# Patient Record
Sex: FEMALE | Race: WHITE | URBAN - METROPOLITAN AREA
[De-identification: names, ages, dates, MRNs, and addresses within clinical notes are randomized per-mention and may not be internally consistent; named-entity substitution may affect disease eponyms.]

---

## 2020-06-25 ENCOUNTER — TELEPHONE ENCOUNTER (OUTPATIENT)
Dept: URBAN - METROPOLITAN AREA CLINIC 40 | Facility: CLINIC | Age: 45
End: 2020-06-25

## 2020-06-25 RX ORDER — AZATHIOPRINE 50 1/1
TAKE TWO TABLETS BY MOUTH ONE TIME DAILY FOR 30 DAYS TABLET ORAL
Qty: 60 | Refills: 11
Start: 2019-06-03

## 2020-07-14 ENCOUNTER — TELEPHONE ENCOUNTER (OUTPATIENT)
Dept: URBAN - METROPOLITAN AREA CLINIC 23 | Facility: CLINIC | Age: 45
End: 2020-07-14

## 2020-07-14 ENCOUNTER — OFFICE VISIT (OUTPATIENT)
Dept: URBAN - METROPOLITAN AREA CLINIC 97 | Facility: CLINIC | Age: 45
End: 2020-07-14
Payer: COMMERCIAL

## 2020-07-14 DIAGNOSIS — K50.80 CROHN'S COLITIS: ICD-10-CM

## 2020-07-14 PROCEDURE — 96415 CHEMO IV INFUSION ADDL HR: CPT | Performed by: INTERNAL MEDICINE

## 2020-07-14 PROCEDURE — 96413 CHEMO IV INFUSION 1 HR: CPT | Performed by: INTERNAL MEDICINE

## 2020-07-14 RX ORDER — INFLIXIMAB 100 MG/10ML
INFUSE 10 MG/KG OVER NO LESS THAN 2 HOUR(S) BY INTRAVENOUS ROUTE EVERY 8 WEEKS INJECTION, POWDER, LYOPHILIZED, FOR SOLUTION INTRAVENOUS
Refills: 11
Start: 2019-06-03

## 2020-07-14 RX ORDER — ESCITALOPRAM 5 MG/1
TABLET, FILM COATED ORAL
Qty: 0 | Refills: 0 | Status: ACTIVE | COMMUNITY
Start: 1900-01-01 | End: 1900-01-01

## 2020-07-14 RX ORDER — INFLIXIMAB 100 MG/10ML
INFUSE 10 MG/KG OVER NO LESS THAN 2 HOUR(S) BY INTRAVENOUS ROUTE EVERY 8 WEEKS INJECTION, POWDER, LYOPHILIZED, FOR SOLUTION INTRAVENOUS
Qty: 1 | Refills: 11 | Status: ACTIVE | COMMUNITY
Start: 2019-06-03 | End: 2021-05-23

## 2020-07-14 RX ORDER — AZATHIOPRINE 50 1/1
TAKE TWO TABLETS BY MOUTH ONE TIME DAILY FOR 30 DAYS TABLET ORAL
Qty: 60 | Refills: 11 | Status: ACTIVE | COMMUNITY
Start: 2019-06-03

## 2020-07-20 ENCOUNTER — OFFICE VISIT (OUTPATIENT)
Dept: URBAN - METROPOLITAN AREA CLINIC 40 | Facility: CLINIC | Age: 45
End: 2020-07-20
Payer: COMMERCIAL

## 2020-07-20 ENCOUNTER — TELEPHONE ENCOUNTER (OUTPATIENT)
Dept: URBAN - METROPOLITAN AREA CLINIC 40 | Facility: CLINIC | Age: 45
End: 2020-07-20

## 2020-07-20 DIAGNOSIS — Z90.49 HX OF RIGHT HEMICOLECTOMY: ICD-10-CM

## 2020-07-20 DIAGNOSIS — Z87.2: ICD-10-CM

## 2020-07-20 DIAGNOSIS — K50.90 CROHN'S DISEASE IN REMISSION: ICD-10-CM

## 2020-07-20 PROCEDURE — 99213 OFFICE O/P EST LOW 20 MIN: CPT | Performed by: INTERNAL MEDICINE

## 2020-07-20 PROCEDURE — G8938 BMI DOC ONL FUP NT DOC: HCPCS | Performed by: INTERNAL MEDICINE

## 2020-07-20 PROCEDURE — G8427 DOCREV CUR MEDS BY ELIG CLIN: HCPCS | Performed by: INTERNAL MEDICINE

## 2020-07-20 PROCEDURE — 1036F TOBACCO NON-USER: CPT | Performed by: INTERNAL MEDICINE

## 2020-07-20 RX ORDER — ESCITALOPRAM 5 MG/1
TABLET, FILM COATED ORAL
Qty: 0 | Refills: 0 | Status: ACTIVE | COMMUNITY
Start: 1900-01-01

## 2020-07-20 RX ORDER — AZATHIOPRINE 50 1/1
TAKE TWO TABLETS BY MOUTH ONE TIME DAILY FOR 30 DAYS TABLET ORAL
Qty: 60 | Refills: 11 | Status: ACTIVE | COMMUNITY
Start: 2019-06-03

## 2020-07-20 RX ORDER — INFLIXIMAB 100 MG/10ML
INFUSE 10 MG/KG OVER NO LESS THAN 2 HOUR(S) BY INTRAVENOUS ROUTE EVERY 8 WEEKS INJECTION, POWDER, LYOPHILIZED, FOR SOLUTION INTRAVENOUS
Refills: 11 | Status: ACTIVE | COMMUNITY
Start: 2019-06-03

## 2020-07-20 RX ORDER — AZATHIOPRINE 50 1/1
TAKE TWO TABLETS BY MOUTH ONE TIME DAILY FOR 30 DAYS TABLET ORAL
Qty: 60 | Refills: 11
Start: 2019-06-03

## 2020-07-20 NOTE — HPI-TODAY'S VISIT:
Patient presents for follow-up of inflammatory bowel disease and she is doing very well at this time and in clinical remission on Remicade 10 mg/kg every 8 weeks.  Last seen about a year ago and was doing well at that time as well.  Prior right hemicolectomy at age 21.  Last colonoscopy 2018 with no evidence of active disease.  Patient was quite symptomatic on first visit with noted complication of erythema nodosum.  Both her IBD and erythema nodosum resolved after initiating Remicade and eventually she stopped mesalamine, but continues Azathioprine 100mg daily.

## 2020-07-21 LAB
A/G RATIO: 1.5
ALBUMIN: 4.5
ALKALINE PHOSPHATASE: 69
ALT (SGPT): 14
AST (SGOT): 18
BASO (ABSOLUTE): 0.1
BASOS: 1
BILIRUBIN, TOTAL: 1.3
BUN/CREATININE RATIO: 15
BUN: 13
CALCIUM: 9.1
CARBON DIOXIDE, TOTAL: 21
CHLORIDE: 100
CREATININE: 0.89
EGFR IF AFRICN AM: 92
EGFR IF NONAFRICN AM: 80
EOS (ABSOLUTE): 0.1
EOS: 2
GLOBULIN, TOTAL: 3.1
GLUCOSE: 85
HEMATOCRIT: 41.4
HEMATOLOGY COMMENTS:: (no result)
HEMOGLOBIN: 14
IMMATURE CELLS: (no result)
IMMATURE GRANS (ABS): 0
IMMATURE GRANULOCYTES: 0
LYMPHS (ABSOLUTE): 1.5
LYMPHS: 21
MCH: 30.2
MCHC: 33.8
MCV: 89
MONOCYTES(ABSOLUTE): 0.7
MONOCYTES: 10
NEUTROPHILS (ABSOLUTE): 5
NEUTROPHILS: 66
NRBC: (no result)
PLATELETS: 382
POTASSIUM: 4.4
PROTEIN, TOTAL: 7.6
RBC: 4.63
RDW: 12.6
SODIUM: 138
WBC: 7.5

## 2020-09-02 ENCOUNTER — OFFICE VISIT (OUTPATIENT)
Dept: URBAN - METROPOLITAN AREA CLINIC 39 | Facility: CLINIC | Age: 45
End: 2020-09-02
Payer: COMMERCIAL

## 2020-09-02 VITALS
SYSTOLIC BLOOD PRESSURE: 110 MMHG | BODY MASS INDEX: 41.48 KG/M2 | RESPIRATION RATE: 18 BRPM | HEIGHT: 64 IN | TEMPERATURE: 97.5 F | DIASTOLIC BLOOD PRESSURE: 71 MMHG | WEIGHT: 243 LBS

## 2020-09-02 DIAGNOSIS — K50.80 CROHN'S COLITIS: ICD-10-CM

## 2020-09-02 PROCEDURE — 96415 CHEMO IV INFUSION ADDL HR: CPT | Performed by: INTERNAL MEDICINE

## 2020-09-02 PROCEDURE — 96413 CHEMO IV INFUSION 1 HR: CPT | Performed by: INTERNAL MEDICINE

## 2020-09-02 RX ORDER — AZATHIOPRINE 50 1/1
TAKE TWO TABLETS BY MOUTH ONE TIME DAILY FOR 30 DAYS TABLET ORAL
Qty: 60 | Refills: 11 | Status: ACTIVE | COMMUNITY
Start: 2019-06-03

## 2020-09-02 RX ORDER — ESCITALOPRAM 5 MG/1
TABLET, FILM COATED ORAL
Qty: 0 | Refills: 0 | Status: ACTIVE | COMMUNITY
Start: 1900-01-01

## 2020-09-02 RX ORDER — INFLIXIMAB 100 MG/10ML
INFUSE 10 MG/KG OVER NO LESS THAN 2 HOUR(S) BY INTRAVENOUS ROUTE EVERY 8 WEEKS INJECTION, POWDER, LYOPHILIZED, FOR SOLUTION INTRAVENOUS
Refills: 11 | Status: ACTIVE | COMMUNITY
Start: 2019-06-03

## 2020-10-28 ENCOUNTER — OFFICE VISIT (OUTPATIENT)
Dept: URBAN - METROPOLITAN AREA CLINIC 39 | Facility: CLINIC | Age: 45
End: 2020-10-28
Payer: COMMERCIAL

## 2020-10-28 VITALS
HEART RATE: 97 BPM | TEMPERATURE: 98.6 F | SYSTOLIC BLOOD PRESSURE: 135 MMHG | DIASTOLIC BLOOD PRESSURE: 93 MMHG | HEIGHT: 64 IN | BODY MASS INDEX: 40.12 KG/M2 | RESPIRATION RATE: 18 BRPM | WEIGHT: 235 LBS

## 2020-10-28 DIAGNOSIS — K50.80 CROHN'S COLITIS: ICD-10-CM

## 2020-10-28 PROCEDURE — 96415 CHEMO IV INFUSION ADDL HR: CPT | Performed by: INTERNAL MEDICINE

## 2020-10-28 PROCEDURE — 96375 TX/PRO/DX INJ NEW DRUG ADDON: CPT | Performed by: INTERNAL MEDICINE

## 2020-10-28 PROCEDURE — 96413 CHEMO IV INFUSION 1 HR: CPT | Performed by: INTERNAL MEDICINE

## 2020-10-28 RX ORDER — AZATHIOPRINE 50 1/1
TAKE TWO TABLETS BY MOUTH ONE TIME DAILY FOR 30 DAYS TABLET ORAL
Qty: 60 | Refills: 11 | Status: ACTIVE | COMMUNITY
Start: 2019-06-03

## 2020-10-28 RX ORDER — ESCITALOPRAM 5 MG/1
TABLET, FILM COATED ORAL
Qty: 0 | Refills: 0 | Status: ACTIVE | COMMUNITY
Start: 1900-01-01

## 2020-10-28 RX ORDER — INFLIXIMAB 100 MG/10ML
INFUSE 10 MG/KG OVER NO LESS THAN 2 HOUR(S) BY INTRAVENOUS ROUTE EVERY 8 WEEKS INJECTION, POWDER, LYOPHILIZED, FOR SOLUTION INTRAVENOUS
Refills: 11 | Status: ACTIVE | COMMUNITY
Start: 2019-06-03

## 2020-12-23 ENCOUNTER — OFFICE VISIT (OUTPATIENT)
Dept: URBAN - METROPOLITAN AREA CLINIC 39 | Facility: CLINIC | Age: 45
End: 2020-12-23
Payer: COMMERCIAL

## 2020-12-23 VITALS
SYSTOLIC BLOOD PRESSURE: 143 MMHG | WEIGHT: 237 LBS | HEIGHT: 64 IN | RESPIRATION RATE: 18 BRPM | DIASTOLIC BLOOD PRESSURE: 91 MMHG | BODY MASS INDEX: 40.46 KG/M2 | TEMPERATURE: 98 F

## 2020-12-23 DIAGNOSIS — K50.80 CROHN'S COLITIS: ICD-10-CM

## 2020-12-23 PROCEDURE — 96413 CHEMO IV INFUSION 1 HR: CPT | Performed by: INTERNAL MEDICINE

## 2020-12-23 PROCEDURE — 96415 CHEMO IV INFUSION ADDL HR: CPT | Performed by: INTERNAL MEDICINE

## 2020-12-23 RX ORDER — INFLIXIMAB 100 MG/10ML
INFUSE 10 MG/KG OVER NO LESS THAN 2 HOUR(S) BY INTRAVENOUS ROUTE EVERY 8 WEEKS INJECTION, POWDER, LYOPHILIZED, FOR SOLUTION INTRAVENOUS
Refills: 11 | Status: ACTIVE | COMMUNITY
Start: 2019-06-03

## 2020-12-23 RX ORDER — ESCITALOPRAM 5 MG/1
TABLET, FILM COATED ORAL
Qty: 0 | Refills: 0 | Status: ACTIVE | COMMUNITY
Start: 1900-01-01

## 2020-12-23 RX ORDER — AZATHIOPRINE 50 1/1
TAKE TWO TABLETS BY MOUTH ONE TIME DAILY FOR 30 DAYS TABLET ORAL
Qty: 60 | Refills: 11 | Status: ACTIVE | COMMUNITY
Start: 2019-06-03

## 2021-01-04 ENCOUNTER — OFFICE VISIT (OUTPATIENT)
Dept: URBAN - METROPOLITAN AREA CLINIC 40 | Facility: CLINIC | Age: 46
End: 2021-01-04

## 2021-01-06 ENCOUNTER — OFFICE VISIT (OUTPATIENT)
Dept: URBAN - METROPOLITAN AREA CLINIC 74 | Facility: CLINIC | Age: 46
End: 2021-01-06
Payer: COMMERCIAL

## 2021-01-06 ENCOUNTER — LAB OUTSIDE AN ENCOUNTER (OUTPATIENT)
Dept: URBAN - METROPOLITAN AREA CLINIC 74 | Facility: CLINIC | Age: 46
End: 2021-01-06

## 2021-01-06 ENCOUNTER — WEB ENCOUNTER (OUTPATIENT)
Dept: URBAN - METROPOLITAN AREA CLINIC 74 | Facility: CLINIC | Age: 46
End: 2021-01-06

## 2021-01-06 DIAGNOSIS — E55.9 VITAMIN D DEFICIENCY: ICD-10-CM

## 2021-01-06 DIAGNOSIS — K76.89 LIVER CYST: ICD-10-CM

## 2021-01-06 DIAGNOSIS — K86.2 PANCREAS CYST: ICD-10-CM

## 2021-01-06 DIAGNOSIS — K50.80 CROHN'S DISEASE OF BOTH SMALL AND LARGE INTESTINE WITHOUT COMPLICATION: ICD-10-CM

## 2021-01-06 DIAGNOSIS — Z90.49 HX OF RIGHT HEMICOLECTOMY: ICD-10-CM

## 2021-01-06 DIAGNOSIS — Z87.2: ICD-10-CM

## 2021-01-06 PROCEDURE — G8428 CUR MEDS NOT DOCUMENT: HCPCS | Performed by: INTERNAL MEDICINE

## 2021-01-06 PROCEDURE — G8417 CALC BMI ABV UP PARAM F/U: HCPCS | Performed by: INTERNAL MEDICINE

## 2021-01-06 PROCEDURE — G8484 FLU IMMUNIZE NO ADMIN: HCPCS | Performed by: INTERNAL MEDICINE

## 2021-01-06 PROCEDURE — 1036F TOBACCO NON-USER: CPT | Performed by: INTERNAL MEDICINE

## 2021-01-06 PROCEDURE — 99214 OFFICE O/P EST MOD 30 MIN: CPT | Performed by: INTERNAL MEDICINE

## 2021-01-06 RX ORDER — AZATHIOPRINE 50 1/1
TAKE TWO TABLETS BY MOUTH ONE TIME DAILY FOR 30 DAYS TABLET ORAL
Qty: 60 | Refills: 11 | Status: ACTIVE | COMMUNITY
Start: 2019-06-03

## 2021-01-06 RX ORDER — INFLIXIMAB 100 MG/10ML
INFUSE 10 MG/KG OVER NO LESS THAN 2 HOUR(S) BY INTRAVENOUS ROUTE EVERY 8 WEEKS INJECTION, POWDER, LYOPHILIZED, FOR SOLUTION INTRAVENOUS
Refills: 11 | Status: ACTIVE | COMMUNITY
Start: 2019-06-03

## 2021-01-06 RX ORDER — ESCITALOPRAM 5 MG/1
TABLET, FILM COATED ORAL
Qty: 0 | Refills: 0 | Status: ACTIVE | COMMUNITY
Start: 1900-01-01

## 2021-01-06 NOTE — HPI-TODAY'S VISIT:
Patient presents for annual follow-up.  Known history of of Crohn's disease.  Patient has been in remission long-term with Remicade and azathioprine 100 mg.  She has been in clinical remission for the past 4 years. Colonoscopy 2018 showed right hemicolectomy, no evidence of active disease. History of right hemicolectomy remotely at age 21. History of erythema nodosum resolved once Remicade started. History of cholecystectomy. History of small bowel obstruction from adhesions remotely.

## 2021-01-12 LAB
A/G RATIO: 1.5
ALBUMIN: 4.4
ALKALINE PHOSPHATASE: 80
ALT (SGPT): 21
AST (SGOT): 36
BILIRUBIN, TOTAL: 1.3
BUN/CREATININE RATIO: 12
BUN: 11
CALCIUM: 9.5
CARBON DIOXIDE, TOTAL: 25
CHLORIDE: 102
CREATININE: 0.93
EGFR IF AFRICN AM: 87
EGFR IF NONAFRICN AM: 76
GLOBULIN, TOTAL: 3
GLUCOSE: 82
HEMATOCRIT: 42.6
HEMOGLOBIN: 14.5
MCH: 30.8
MCHC: 34
MCV: 90
NRBC: (no result)
PLATELETS: 421
POTASSIUM: 4.1
PROTEIN, TOTAL: 7.4
QUANTIFERON CRITERIA: (no result)
QUANTIFERON INCUBATION: (no result)
QUANTIFERON MITOGEN VALUE: 1.36
QUANTIFERON NIL VALUE: 0.03
QUANTIFERON TB1 AG VALUE: 0.02
QUANTIFERON TB2 AG VALUE: 0.02
QUANTIFERON-TB GOLD PLUS: NEGATIVE
RBC: 4.71
RDW: 13.7
SODIUM: 140
VITAMIN D, 25-HYDROXY: 27.8
WBC: 8.7

## 2021-02-15 ENCOUNTER — OFFICE VISIT (OUTPATIENT)
Dept: URBAN - METROPOLITAN AREA CLINIC 39 | Facility: CLINIC | Age: 46
End: 2021-02-15
Payer: COMMERCIAL

## 2021-02-15 VITALS
DIASTOLIC BLOOD PRESSURE: 80 MMHG | SYSTOLIC BLOOD PRESSURE: 114 MMHG | RESPIRATION RATE: 18 BRPM | WEIGHT: 226.9 LBS | TEMPERATURE: 97.2 F | HEIGHT: 64 IN | HEART RATE: 91 BPM | BODY MASS INDEX: 38.74 KG/M2

## 2021-02-15 DIAGNOSIS — K50.80 CROHN'S COLITIS: ICD-10-CM

## 2021-02-15 PROCEDURE — 96415 CHEMO IV INFUSION ADDL HR: CPT | Performed by: INTERNAL MEDICINE

## 2021-02-15 PROCEDURE — 96413 CHEMO IV INFUSION 1 HR: CPT | Performed by: INTERNAL MEDICINE

## 2021-02-15 RX ORDER — ESCITALOPRAM 5 MG/1
TABLET, FILM COATED ORAL
Qty: 0 | Refills: 0 | Status: ACTIVE | COMMUNITY
Start: 1900-01-01

## 2021-02-15 RX ORDER — INFLIXIMAB 100 MG/10ML
INFUSE 10 MG/KG OVER NO LESS THAN 2 HOUR(S) BY INTRAVENOUS ROUTE EVERY 8 WEEKS INJECTION, POWDER, LYOPHILIZED, FOR SOLUTION INTRAVENOUS
Refills: 11 | Status: ACTIVE | COMMUNITY
Start: 2019-06-03

## 2021-02-15 RX ORDER — AZATHIOPRINE 50 1/1
TAKE TWO TABLETS BY MOUTH ONE TIME DAILY FOR 30 DAYS TABLET ORAL
Qty: 60 | Refills: 11 | Status: ACTIVE | COMMUNITY
Start: 2019-06-03

## 2021-02-16 ENCOUNTER — OFFICE VISIT (OUTPATIENT)
Dept: URBAN - METROPOLITAN AREA SURGERY CENTER 30 | Facility: SURGERY CENTER | Age: 46
End: 2021-02-16
Payer: COMMERCIAL

## 2021-02-16 DIAGNOSIS — K50.80 CROHN'S COLITIS: ICD-10-CM

## 2021-02-16 PROCEDURE — G8907 PT DOC NO EVENTS ON DISCHARG: HCPCS | Performed by: INTERNAL MEDICINE

## 2021-02-16 PROCEDURE — 45380 COLONOSCOPY AND BIOPSY: CPT | Performed by: INTERNAL MEDICINE

## 2021-03-03 ENCOUNTER — OFFICE VISIT (OUTPATIENT)
Dept: URBAN - METROPOLITAN AREA CLINIC 74 | Facility: CLINIC | Age: 46
End: 2021-03-03
Payer: COMMERCIAL

## 2021-03-03 ENCOUNTER — WEB ENCOUNTER (OUTPATIENT)
Dept: URBAN - METROPOLITAN AREA CLINIC 74 | Facility: CLINIC | Age: 46
End: 2021-03-03

## 2021-03-03 DIAGNOSIS — K86.2 PANCREAS CYST: ICD-10-CM

## 2021-03-03 DIAGNOSIS — E55.9 VITAMIN D DEFICIENCY: ICD-10-CM

## 2021-03-03 DIAGNOSIS — Z87.2: ICD-10-CM

## 2021-03-03 DIAGNOSIS — K50.80 CROHN'S DISEASE OF BOTH SMALL AND LARGE INTESTINE WITHOUT COMPLICATION: ICD-10-CM

## 2021-03-03 DIAGNOSIS — K76.89 LIVER CYST: ICD-10-CM

## 2021-03-03 DIAGNOSIS — Z90.49 HX OF RIGHT HEMICOLECTOMY: ICD-10-CM

## 2021-03-03 PROBLEM — 428305005: Status: ACTIVE | Noted: 2021-01-06

## 2021-03-03 PROBLEM — 34713006: Status: ACTIVE | Noted: 2021-01-06

## 2021-03-03 PROBLEM — 85057007: Status: ACTIVE | Noted: 2021-01-06

## 2021-03-03 PROCEDURE — 99213 OFFICE O/P EST LOW 20 MIN: CPT | Performed by: INTERNAL MEDICINE

## 2021-03-03 RX ORDER — ESCITALOPRAM 5 MG/1
TABLET, FILM COATED ORAL
Qty: 0 | Refills: 0 | Status: ACTIVE | COMMUNITY
Start: 1900-01-01

## 2021-03-03 RX ORDER — AZATHIOPRINE 50 1/1
TAKE TWO TABLETS BY MOUTH ONE TIME DAILY FOR 30 DAYS TABLET ORAL
Qty: 60 | Refills: 11 | Status: ACTIVE | COMMUNITY
Start: 2019-06-03

## 2021-03-03 RX ORDER — INFLIXIMAB 100 MG/10ML
INFUSE 10 MG/KG OVER NO LESS THAN 2 HOUR(S) BY INTRAVENOUS ROUTE EVERY 8 WEEKS INJECTION, POWDER, LYOPHILIZED, FOR SOLUTION INTRAVENOUS
Refills: 11 | Status: ACTIVE | COMMUNITY
Start: 2019-06-03

## 2021-03-03 NOTE — HPI-TODAY'S VISIT:
Patient presents for annual follow-up.  Known history of of Crohn's disease.  Patient has been in remission long-term with Remicade and azathioprine 100 mg.  She has been in clinical remission for the past 4 years. Colonoscopy 2018 showed right hemicolectomy, no evidence of active disease. History of right hemicolectomy remotely at age 21. History of erythema nodosum resolved once Remicade started. History of cholecystectomy. History of small bowel obstruction from adhesions remotely. Recent surveillance colonoscopy was negative, full endoscopic and histologic remission confirmed.  Pt doing well on Remicade/Aza, no active GI c/o. CT ordered for surveillance of pancreas cysts, not completed yet.

## 2021-05-12 ENCOUNTER — OFFICE VISIT (OUTPATIENT)
Dept: URBAN - METROPOLITAN AREA CLINIC 73 | Facility: CLINIC | Age: 46
End: 2021-05-12
Payer: COMMERCIAL

## 2021-05-12 VITALS
TEMPERATURE: 97.2 F | DIASTOLIC BLOOD PRESSURE: 86 MMHG | WEIGHT: 222.6 LBS | HEART RATE: 89 BPM | HEIGHT: 64 IN | SYSTOLIC BLOOD PRESSURE: 139 MMHG | BODY MASS INDEX: 38 KG/M2 | RESPIRATION RATE: 18 BRPM

## 2021-05-12 DIAGNOSIS — K50.80 CROHN'S COLITIS: ICD-10-CM

## 2021-05-12 PROCEDURE — 96413 CHEMO IV INFUSION 1 HR: CPT | Performed by: INTERNAL MEDICINE

## 2021-05-12 PROCEDURE — 96415 CHEMO IV INFUSION ADDL HR: CPT | Performed by: INTERNAL MEDICINE

## 2021-05-12 RX ORDER — INFLIXIMAB 100 MG/10ML
INFUSE 10 MG/KG OVER NO LESS THAN 2 HOUR(S) BY INTRAVENOUS ROUTE EVERY 8 WEEKS INJECTION, POWDER, LYOPHILIZED, FOR SOLUTION INTRAVENOUS
Refills: 11 | Status: ACTIVE | COMMUNITY
Start: 2019-06-03

## 2021-05-12 RX ORDER — ESCITALOPRAM 5 MG/1
TABLET, FILM COATED ORAL
Qty: 0 | Refills: 0 | Status: ACTIVE | COMMUNITY
Start: 1900-01-01

## 2021-05-12 RX ORDER — AZATHIOPRINE 50 1/1
TAKE TWO TABLETS BY MOUTH ONE TIME DAILY FOR 30 DAYS TABLET ORAL
Qty: 60 | Refills: 11 | Status: ACTIVE | COMMUNITY
Start: 2019-06-03

## 2021-07-07 ENCOUNTER — OFFICE VISIT (OUTPATIENT)
Dept: URBAN - METROPOLITAN AREA CLINIC 73 | Facility: CLINIC | Age: 46
End: 2021-07-07
Payer: COMMERCIAL

## 2021-07-07 ENCOUNTER — TELEPHONE ENCOUNTER (OUTPATIENT)
Dept: URBAN - METROPOLITAN AREA CLINIC 73 | Facility: CLINIC | Age: 46
End: 2021-07-07

## 2021-07-07 VITALS
HEART RATE: 85 BPM | RESPIRATION RATE: 18 BRPM | DIASTOLIC BLOOD PRESSURE: 83 MMHG | SYSTOLIC BLOOD PRESSURE: 147 MMHG | WEIGHT: 221 LBS | HEIGHT: 64 IN | TEMPERATURE: 97.8 F | BODY MASS INDEX: 37.73 KG/M2

## 2021-07-07 DIAGNOSIS — K50.80 CROHN'S COLITIS: ICD-10-CM

## 2021-07-07 PROCEDURE — 96413 CHEMO IV INFUSION 1 HR: CPT | Performed by: INTERNAL MEDICINE

## 2021-07-07 PROCEDURE — 96415 CHEMO IV INFUSION ADDL HR: CPT | Performed by: INTERNAL MEDICINE

## 2021-07-07 RX ORDER — AZATHIOPRINE 50 1/1
TAKE TWO TABLETS BY MOUTH ONE TIME DAILY FOR 30 DAYS TABLET ORAL
Qty: 60 | Refills: 11 | Status: ACTIVE | COMMUNITY
Start: 2019-06-03

## 2021-07-07 RX ORDER — ESCITALOPRAM 5 MG/1
TABLET, FILM COATED ORAL
Qty: 0 | Refills: 0 | Status: ACTIVE | COMMUNITY
Start: 1900-01-01

## 2021-07-07 RX ORDER — INFLIXIMAB 100 MG/10ML
INFUSE 10 MG/KG OVER NO LESS THAN 2 HOUR(S) BY INTRAVENOUS ROUTE EVERY 8 WEEKS INJECTION, POWDER, LYOPHILIZED, FOR SOLUTION INTRAVENOUS
Refills: 11 | Status: ACTIVE | COMMUNITY
Start: 2019-06-03

## 2021-09-01 ENCOUNTER — OFFICE VISIT (OUTPATIENT)
Dept: URBAN - METROPOLITAN AREA CLINIC 73 | Facility: CLINIC | Age: 46
End: 2021-09-01
Payer: COMMERCIAL

## 2021-09-01 VITALS
BODY MASS INDEX: 36.7 KG/M2 | DIASTOLIC BLOOD PRESSURE: 73 MMHG | WEIGHT: 215 LBS | RESPIRATION RATE: 18 BRPM | TEMPERATURE: 97.5 F | HEIGHT: 64 IN | HEART RATE: 109 BPM | SYSTOLIC BLOOD PRESSURE: 146 MMHG

## 2021-09-01 DIAGNOSIS — K50.80 CROHN'S COLITIS: ICD-10-CM

## 2021-09-01 PROCEDURE — 96413 CHEMO IV INFUSION 1 HR: CPT | Performed by: INTERNAL MEDICINE

## 2021-09-01 PROCEDURE — 96415 CHEMO IV INFUSION ADDL HR: CPT | Performed by: INTERNAL MEDICINE

## 2021-09-01 RX ORDER — AZATHIOPRINE 50 1/1
TAKE TWO TABLETS BY MOUTH ONE TIME DAILY FOR 30 DAYS TABLET ORAL
Qty: 60 | Refills: 11 | Status: ACTIVE | COMMUNITY
Start: 2019-06-03

## 2021-09-01 RX ORDER — ESCITALOPRAM 5 MG/1
TABLET, FILM COATED ORAL
Qty: 0 | Refills: 0 | Status: ACTIVE | COMMUNITY
Start: 1900-01-01

## 2021-09-01 RX ORDER — INFLIXIMAB 100 MG/10ML
INFUSE 10 MG/KG OVER NO LESS THAN 2 HOUR(S) BY INTRAVENOUS ROUTE EVERY 8 WEEKS INJECTION, POWDER, LYOPHILIZED, FOR SOLUTION INTRAVENOUS
Refills: 11 | Status: ACTIVE | COMMUNITY
Start: 2019-06-03

## 2021-11-02 ENCOUNTER — TELEPHONE ENCOUNTER (OUTPATIENT)
Dept: URBAN - METROPOLITAN AREA CLINIC 96 | Facility: CLINIC | Age: 46
End: 2021-11-02

## 2021-11-02 ENCOUNTER — TELEPHONE ENCOUNTER (OUTPATIENT)
Dept: URBAN - METROPOLITAN AREA CLINIC 74 | Facility: CLINIC | Age: 46
End: 2021-11-02

## 2021-11-02 RX ORDER — AZATHIOPRINE 50 1/1
TAKE TWO TABLETS BY MOUTH ONE TIME DAILY FOR 30 DAYS TABLET ORAL
Qty: 60 | Refills: 6
Start: 2019-06-03 | End: 2022-05-31

## 2021-11-05 ENCOUNTER — TELEPHONE ENCOUNTER (OUTPATIENT)
Dept: URBAN - METROPOLITAN AREA CLINIC 74 | Facility: CLINIC | Age: 46
End: 2021-11-05

## 2021-11-05 RX ORDER — AZATHIOPRINE 50 1/1
TAKE TWO TABLETS BY MOUTH ONE TIME DAILY FOR 30 DAYS TABLET ORAL
Qty: 60 | Refills: 6 | Status: ACTIVE | COMMUNITY
Start: 2019-06-03 | End: 2022-05-31

## 2021-11-05 RX ORDER — INFLIXIMAB 100 MG/10ML
INFUSE 10 MG/KG OVER NO LESS THAN 2 HOUR(S) BY INTRAVENOUS ROUTE EVERY 8 WEEKS INJECTION, POWDER, LYOPHILIZED, FOR SOLUTION INTRAVENOUS
Refills: 11 | Status: ACTIVE | COMMUNITY
Start: 2019-06-03

## 2021-11-05 RX ORDER — ESCITALOPRAM 5 MG/1
TABLET, FILM COATED ORAL
Qty: 0 | Refills: 0 | Status: ACTIVE | COMMUNITY
Start: 1900-01-01

## 2021-11-15 ENCOUNTER — TELEPHONE ENCOUNTER (OUTPATIENT)
Dept: URBAN - METROPOLITAN AREA CLINIC 73 | Facility: CLINIC | Age: 46
End: 2021-11-15

## 2021-11-15 ENCOUNTER — OFFICE VISIT (OUTPATIENT)
Dept: URBAN - METROPOLITAN AREA CLINIC 73 | Facility: CLINIC | Age: 46
End: 2021-11-15
Payer: COMMERCIAL

## 2021-11-15 VITALS
RESPIRATION RATE: 18 BRPM | SYSTOLIC BLOOD PRESSURE: 141 MMHG | HEIGHT: 64 IN | HEART RATE: 94 BPM | TEMPERATURE: 97 F | DIASTOLIC BLOOD PRESSURE: 90 MMHG | WEIGHT: 218 LBS | BODY MASS INDEX: 37.22 KG/M2

## 2021-11-15 DIAGNOSIS — K50.80 CROHN'S COLITIS: ICD-10-CM

## 2021-11-15 PROBLEM — 71833008: Status: ACTIVE | Noted: 2021-01-06

## 2021-11-15 PROCEDURE — 96415 CHEMO IV INFUSION ADDL HR: CPT | Performed by: INTERNAL MEDICINE

## 2021-11-15 PROCEDURE — 96413 CHEMO IV INFUSION 1 HR: CPT | Performed by: INTERNAL MEDICINE

## 2021-11-15 RX ORDER — INFLIXIMAB 100 MG/10ML
INFUSE 10 MG/KG OVER NO LESS THAN 2 HOUR(S) BY INTRAVENOUS ROUTE EVERY 8 WEEKS INJECTION, POWDER, LYOPHILIZED, FOR SOLUTION INTRAVENOUS
Refills: 11 | Status: ACTIVE | COMMUNITY
Start: 2019-06-03

## 2021-11-15 RX ORDER — ESCITALOPRAM 5 MG/1
TABLET, FILM COATED ORAL
Qty: 0 | Refills: 0 | Status: ACTIVE | COMMUNITY
Start: 1900-01-01

## 2021-11-15 RX ORDER — AZATHIOPRINE 50 1/1
TAKE TWO TABLETS BY MOUTH ONE TIME DAILY FOR 30 DAYS TABLET ORAL
Qty: 60 | Refills: 6 | Status: ACTIVE | COMMUNITY
Start: 2019-06-03 | End: 2022-05-31

## 2022-01-10 ENCOUNTER — OFFICE VISIT (OUTPATIENT)
Dept: URBAN - METROPOLITAN AREA CLINIC 73 | Facility: CLINIC | Age: 47
End: 2022-01-10

## 2022-02-22 ENCOUNTER — DASHBOARD ENCOUNTERS (OUTPATIENT)
Age: 47
End: 2022-02-22

## 2022-03-02 ENCOUNTER — OFFICE VISIT (OUTPATIENT)
Dept: URBAN - METROPOLITAN AREA CLINIC 74 | Facility: CLINIC | Age: 47
End: 2022-03-02

## 2022-03-02 RX ORDER — AZATHIOPRINE 50 1/1
TAKE TWO TABLETS BY MOUTH ONE TIME DAILY FOR 30 DAYS TABLET ORAL
Qty: 60 | Refills: 6 | Status: ACTIVE | COMMUNITY
Start: 2019-06-03 | End: 2022-05-31

## 2022-03-02 RX ORDER — ESCITALOPRAM 5 MG/1
TABLET, FILM COATED ORAL
Qty: 0 | Refills: 0 | Status: ACTIVE | COMMUNITY
Start: 1900-01-01

## 2022-03-02 RX ORDER — INFLIXIMAB 100 MG/10ML
INFUSE 10 MG/KG OVER NO LESS THAN 2 HOUR(S) BY INTRAVENOUS ROUTE EVERY 8 WEEKS INJECTION, POWDER, LYOPHILIZED, FOR SOLUTION INTRAVENOUS
Refills: 11 | Status: ACTIVE | COMMUNITY
Start: 2019-06-03

## 2022-03-07 ENCOUNTER — OFFICE VISIT (OUTPATIENT)
Dept: URBAN - METROPOLITAN AREA CLINIC 73 | Facility: CLINIC | Age: 47
End: 2022-03-07